# Patient Record
(demographics unavailable — no encounter records)

---

## 2024-10-22 NOTE — PHYSICAL EXAM
[Midline] : trachea located in midline position [Normal] : no rashes [de-identified] : cerumen in the ear canals; once removed normal EACs

## 2024-10-22 NOTE — HISTORY OF PRESENT ILLNESS
[de-identified] : Patient comes in with ear clogging and bilateral cerumen. He does not have any issues with ringing in the ears or pain

## 2024-10-22 NOTE — ASSESSMENT
[FreeTextEntry1] : Patient cerumen impaction curetted out going down to [place] moving tomorrow follow-up as needed

## 2024-10-22 NOTE — END OF VISIT
[FreeTextEntry3] : I, Dr. Cabrera personally performed the evaluation and management (E/M) services , including all procedures, for this established patient who presents today with (a) new problem(s)/exacerbation of (an) existing condition(s). That E/M includes conducting the clinically appropriate interval history &/or exam, assessing all new/exacerbated conditions, and establishing a new plan of care. Today, my JONATHAN, Kaycee Gunter, was here to observe &/or participate in the visit & follow plan of care established by me.